# Patient Record
Sex: FEMALE | Race: ASIAN | NOT HISPANIC OR LATINO | URBAN - METROPOLITAN AREA
[De-identification: names, ages, dates, MRNs, and addresses within clinical notes are randomized per-mention and may not be internally consistent; named-entity substitution may affect disease eponyms.]

---

## 2018-08-18 ENCOUNTER — EMERGENCY (EMERGENCY)
Facility: HOSPITAL | Age: 58
LOS: 1 days | Discharge: HOME | End: 2018-08-18
Attending: EMERGENCY MEDICINE | Admitting: EMERGENCY MEDICINE
Payer: COMMERCIAL

## 2018-08-18 VITALS
DIASTOLIC BLOOD PRESSURE: 85 MMHG | OXYGEN SATURATION: 100 % | TEMPERATURE: 99 F | SYSTOLIC BLOOD PRESSURE: 160 MMHG | RESPIRATION RATE: 16 BRPM | HEART RATE: 77 BPM

## 2018-08-18 PROCEDURE — 99284 EMERGENCY DEPT VISIT MOD MDM: CPT | Mod: 25

## 2018-08-18 NOTE — ED ADULT TRIAGE NOTE - CHIEF COMPLAINT QUOTE
dizziness, feeling "room spinning." Denies weakness, SOB, CP. States no help with meclizine. Dx with vertigo x 3 days ago

## 2018-08-19 VITALS
SYSTOLIC BLOOD PRESSURE: 188 MMHG | OXYGEN SATURATION: 100 % | DIASTOLIC BLOOD PRESSURE: 83 MMHG | HEART RATE: 66 BPM | RESPIRATION RATE: 16 BRPM

## 2018-08-19 LAB
ALBUMIN SERPL ELPH-MCNC: 4.3 G/DL — SIGNIFICANT CHANGE UP (ref 3.3–5)
ALP SERPL-CCNC: 72 U/L — SIGNIFICANT CHANGE UP (ref 40–120)
ALT FLD-CCNC: 45 U/L — HIGH (ref 4–33)
APTT BLD: 30.2 SEC — SIGNIFICANT CHANGE UP (ref 27.5–37.4)
AST SERPL-CCNC: 24 U/L — SIGNIFICANT CHANGE UP (ref 4–32)
BASOPHILS # BLD AUTO: 0.09 K/UL — SIGNIFICANT CHANGE UP (ref 0–0.2)
BASOPHILS NFR BLD AUTO: 1.2 % — SIGNIFICANT CHANGE UP (ref 0–2)
BILIRUB SERPL-MCNC: 0.3 MG/DL — SIGNIFICANT CHANGE UP (ref 0.2–1.2)
BUN SERPL-MCNC: 15 MG/DL — SIGNIFICANT CHANGE UP (ref 7–23)
CALCIUM SERPL-MCNC: 9.4 MG/DL — SIGNIFICANT CHANGE UP (ref 8.4–10.5)
CHLORIDE SERPL-SCNC: 104 MMOL/L — SIGNIFICANT CHANGE UP (ref 98–107)
CO2 SERPL-SCNC: 24 MMOL/L — SIGNIFICANT CHANGE UP (ref 22–31)
CREAT SERPL-MCNC: 0.72 MG/DL — SIGNIFICANT CHANGE UP (ref 0.5–1.3)
EOSINOPHIL # BLD AUTO: 0.4 K/UL — SIGNIFICANT CHANGE UP (ref 0–0.5)
EOSINOPHIL NFR BLD AUTO: 5.4 % — SIGNIFICANT CHANGE UP (ref 0–6)
GLUCOSE SERPL-MCNC: 97 MG/DL — SIGNIFICANT CHANGE UP (ref 70–99)
HCT VFR BLD CALC: 41 % — SIGNIFICANT CHANGE UP (ref 34.5–45)
HGB BLD-MCNC: 12.4 G/DL — SIGNIFICANT CHANGE UP (ref 11.5–15.5)
IMM GRANULOCYTES # BLD AUTO: 0.02 # — SIGNIFICANT CHANGE UP
IMM GRANULOCYTES NFR BLD AUTO: 0.3 % — SIGNIFICANT CHANGE UP (ref 0–1.5)
INR BLD: 0.89 — SIGNIFICANT CHANGE UP (ref 0.88–1.17)
LYMPHOCYTES # BLD AUTO: 3.45 K/UL — HIGH (ref 1–3.3)
LYMPHOCYTES # BLD AUTO: 46.3 % — HIGH (ref 13–44)
MCHC RBC-ENTMCNC: 22.8 PG — LOW (ref 27–34)
MCHC RBC-ENTMCNC: 30.2 % — LOW (ref 32–36)
MCV RBC AUTO: 75.2 FL — LOW (ref 80–100)
MONOCYTES # BLD AUTO: 0.43 K/UL — SIGNIFICANT CHANGE UP (ref 0–0.9)
MONOCYTES NFR BLD AUTO: 5.8 % — SIGNIFICANT CHANGE UP (ref 2–14)
NEUTROPHILS # BLD AUTO: 3.06 K/UL — SIGNIFICANT CHANGE UP (ref 1.8–7.4)
NEUTROPHILS NFR BLD AUTO: 41 % — LOW (ref 43–77)
NRBC # FLD: 0 — SIGNIFICANT CHANGE UP
PLATELET # BLD AUTO: 297 K/UL — SIGNIFICANT CHANGE UP (ref 150–400)
PMV BLD: 10.5 FL — SIGNIFICANT CHANGE UP (ref 7–13)
POTASSIUM SERPL-MCNC: 3.8 MMOL/L — SIGNIFICANT CHANGE UP (ref 3.5–5.3)
POTASSIUM SERPL-SCNC: 3.8 MMOL/L — SIGNIFICANT CHANGE UP (ref 3.5–5.3)
PROT SERPL-MCNC: 7.4 G/DL — SIGNIFICANT CHANGE UP (ref 6–8.3)
PROTHROM AB SERPL-ACNC: 9.9 SEC — SIGNIFICANT CHANGE UP (ref 9.8–13.1)
RBC # BLD: 5.45 M/UL — HIGH (ref 3.8–5.2)
RBC # FLD: 15.9 % — HIGH (ref 10.3–14.5)
SODIUM SERPL-SCNC: 141 MMOL/L — SIGNIFICANT CHANGE UP (ref 135–145)
TROPONIN T, HIGH SENSITIVITY: < 6 NG/L — SIGNIFICANT CHANGE UP (ref ?–14)
WBC # BLD: 7.45 K/UL — SIGNIFICANT CHANGE UP (ref 3.8–10.5)
WBC # FLD AUTO: 7.45 K/UL — SIGNIFICANT CHANGE UP (ref 3.8–10.5)

## 2018-08-19 PROCEDURE — 70450 CT HEAD/BRAIN W/O DYE: CPT | Mod: 26

## 2018-08-19 RX ORDER — SODIUM CHLORIDE 9 MG/ML
1000 INJECTION INTRAMUSCULAR; INTRAVENOUS; SUBCUTANEOUS ONCE
Qty: 0 | Refills: 0 | Status: COMPLETED | OUTPATIENT
Start: 2018-08-19 | End: 2018-08-19

## 2018-08-19 RX ORDER — METOCLOPRAMIDE HCL 10 MG
10 TABLET ORAL ONCE
Qty: 0 | Refills: 0 | Status: COMPLETED | OUTPATIENT
Start: 2018-08-19 | End: 2018-08-19

## 2018-08-19 RX ORDER — DIAZEPAM 5 MG
1 TABLET ORAL
Qty: 9 | Refills: 0
Start: 2018-08-19 | End: 2018-08-21

## 2018-08-19 RX ADMIN — SODIUM CHLORIDE 1000 MILLILITER(S): 9 INJECTION INTRAMUSCULAR; INTRAVENOUS; SUBCUTANEOUS at 02:26

## 2018-08-19 RX ADMIN — Medication 10 MILLIGRAM(S): at 02:26

## 2018-08-19 NOTE — ED ADULT NURSE NOTE - OBJECTIVE STATEMENT
alert no distress c/o dizziness  worse when going from lying to standing   no dizziness at present   c/o 4/10 headache ( back of head)

## 2018-08-19 NOTE — ED ADULT NURSE NOTE - NSIMPLEMENTINTERV_GEN_ALL_ED
Implemented All Universal Safety Interventions:  [place] to call system. Call bell, personal items and telephone within reach. Instruct patient to call for assistance. Room bathroom lighting operational. Non-slip footwear when patient is off stretcher. Physically safe environment: no spills, clutter or unnecessary equipment. Stretcher in lowest position, wheels locked, appropriate side rails in place.

## 2018-08-19 NOTE — ED PROVIDER NOTE - ATTENDING CONTRIBUTION TO CARE
58 y/o F with h/o HTN, hyperlipidemia with 3 days of intermittent dizziness.  Pt reports room-spinning sensation, mostly when lying flat since Thursday.  She had seen her PMD at the time of onset and started on meclizine, which she has been taking q4hrs without improvement.  Today pt also reports band like headache, took tylenol at noon without relief.  No fever, chills, neck pain or stiffness, cp, sob, abd pain, back pain.  (+)nausea but no vomiting  Currently denies dizziness, but c/o headache.  Well appearing, sitting comfortably in stretcher, awake and alert, nontoxic.  VSS.  NCAT, EOMI, pupil unequal L>R, reactive to light.  Neck is supple no rigidity.  Lungs cta bl.  Cards nl S1/S2, RRR, no MRG.  Abd soft ntnd.  CN II-XII otherwise intact, no pronator drift, no dysmetria on ftn, strength full in all extremities, gait normal no ataxia.  Plan for labs, ivfs, reglan, ct head, reassess.

## 2018-08-19 NOTE — ED PROVIDER NOTE - PROGRESS NOTE DETAILS
Feels well, ambulatory with steady gait, no complaints at this time. CT and labs negative. Given copies of results and neuro list, strict return precautions and rx for valium.

## 2018-08-19 NOTE — ED PROVIDER NOTE - MEDICAL DECISION MAKING DETAILS
57F with dizziness/lightheadedness/near syncope worsening x 3 days. Unequal pupils on exam otherwise nonfocal. Plan for labs, ekg, ct head, reassess.

## 2018-08-19 NOTE — ED PROVIDER NOTE - OBJECTIVE STATEMENT
57F PMH HTN, HLD p/w dizziness x 3 days, worse with lying flat or changing position, particularly when going to lie down. Has been taking meclizine 4q without relief from PMD. Also notes band like headache that started tonight around 11am, progressively worsening through the day, gradual onset. No blurred vision. No NV, though had nausea at initial onset. No chest pain or SOB. No urinary complaints. Ambulatory with steady gait. No prior history of similar dizziness. Not worse with change in head position. Tonight felt like she might pass out from dizziness so came to ED for evaluation.   Daughter at bedside translating.

## 2018-08-20 PROBLEM — I10 ESSENTIAL (PRIMARY) HYPERTENSION: Chronic | Status: ACTIVE | Noted: 2018-08-19

## 2018-08-20 PROBLEM — E78.5 HYPERLIPIDEMIA, UNSPECIFIED: Chronic | Status: ACTIVE | Noted: 2018-08-19

## 2018-10-02 PROBLEM — Z00.00 ENCOUNTER FOR PREVENTIVE HEALTH EXAMINATION: Status: ACTIVE | Noted: 2018-10-02

## 2018-10-22 ENCOUNTER — APPOINTMENT (OUTPATIENT)
Dept: NEUROLOGY | Facility: CLINIC | Age: 58
End: 2018-10-22

## 2020-02-04 ENCOUNTER — EMERGENCY (EMERGENCY)
Facility: HOSPITAL | Age: 60
LOS: 1 days | Discharge: HOME | End: 2020-02-04
Attending: EMERGENCY MEDICINE | Admitting: EMERGENCY MEDICINE
Payer: COMMERCIAL

## 2020-02-04 VITALS
SYSTOLIC BLOOD PRESSURE: 126 MMHG | TEMPERATURE: 98 F | HEART RATE: 95 BPM | OXYGEN SATURATION: 100 % | RESPIRATION RATE: 20 BRPM | DIASTOLIC BLOOD PRESSURE: 70 MMHG

## 2020-02-04 LAB
ALBUMIN SERPL ELPH-MCNC: 4.8 G/DL — SIGNIFICANT CHANGE UP (ref 3.3–5)
ALP SERPL-CCNC: 77 U/L — SIGNIFICANT CHANGE UP (ref 40–120)
ALT FLD-CCNC: 44 U/L — HIGH (ref 4–33)
AMPHET UR-MCNC: NEGATIVE — SIGNIFICANT CHANGE UP
ANION GAP SERPL CALC-SCNC: 15 MMO/L — HIGH (ref 7–14)
ANISOCYTOSIS BLD QL: SLIGHT — SIGNIFICANT CHANGE UP
APAP SERPL-MCNC: < 15 UG/ML — LOW (ref 15–25)
AST SERPL-CCNC: 26 U/L — SIGNIFICANT CHANGE UP (ref 4–32)
BARBITURATES UR SCN-MCNC: NEGATIVE — SIGNIFICANT CHANGE UP
BASOPHILS # BLD AUTO: 0.08 K/UL — SIGNIFICANT CHANGE UP (ref 0–0.2)
BASOPHILS NFR BLD AUTO: 1 % — SIGNIFICANT CHANGE UP (ref 0–2)
BASOPHILS NFR SPEC: 1.8 % — SIGNIFICANT CHANGE UP (ref 0–2)
BENZODIAZ UR-MCNC: NEGATIVE — SIGNIFICANT CHANGE UP
BILIRUB SERPL-MCNC: 0.5 MG/DL — SIGNIFICANT CHANGE UP (ref 0.2–1.2)
BLASTS # FLD: 0 % — SIGNIFICANT CHANGE UP (ref 0–0)
BUN SERPL-MCNC: 12 MG/DL — SIGNIFICANT CHANGE UP (ref 7–23)
CALCIUM SERPL-MCNC: 9.6 MG/DL — SIGNIFICANT CHANGE UP (ref 8.4–10.5)
CANNABINOIDS UR-MCNC: NEGATIVE — SIGNIFICANT CHANGE UP
CHLORIDE SERPL-SCNC: 100 MMOL/L — SIGNIFICANT CHANGE UP (ref 98–107)
CO2 SERPL-SCNC: 22 MMOL/L — SIGNIFICANT CHANGE UP (ref 22–31)
COCAINE METAB.OTHER UR-MCNC: NEGATIVE — SIGNIFICANT CHANGE UP
CREAT SERPL-MCNC: 0.62 MG/DL — SIGNIFICANT CHANGE UP (ref 0.5–1.3)
EOSINOPHIL # BLD AUTO: 0.24 K/UL — SIGNIFICANT CHANGE UP (ref 0–0.5)
EOSINOPHIL NFR BLD AUTO: 3 % — SIGNIFICANT CHANGE UP (ref 0–6)
EOSINOPHIL NFR FLD: 1.8 % — SIGNIFICANT CHANGE UP (ref 0–6)
ETHANOL BLD-MCNC: < 10 MG/DL — SIGNIFICANT CHANGE UP
GLUCOSE SERPL-MCNC: 147 MG/DL — HIGH (ref 70–99)
HBA1C BLD-MCNC: 5.7 % — HIGH (ref 4–5.6)
HCT VFR BLD CALC: 45.5 % — HIGH (ref 34.5–45)
HGB BLD-MCNC: 13.8 G/DL — SIGNIFICANT CHANGE UP (ref 11.5–15.5)
IMM GRANULOCYTES NFR BLD AUTO: 0.5 % — SIGNIFICANT CHANGE UP (ref 0–1.5)
LYMPHOCYTES # BLD AUTO: 2.14 K/UL — SIGNIFICANT CHANGE UP (ref 1–3.3)
LYMPHOCYTES # BLD AUTO: 26.4 % — SIGNIFICANT CHANGE UP (ref 13–44)
LYMPHOCYTES NFR SPEC AUTO: 24.8 % — SIGNIFICANT CHANGE UP (ref 13–44)
MAGNESIUM SERPL-MCNC: 2.2 MG/DL — SIGNIFICANT CHANGE UP (ref 1.6–2.6)
MCHC RBC-ENTMCNC: 22.4 PG — LOW (ref 27–34)
MCHC RBC-ENTMCNC: 30.3 % — LOW (ref 32–36)
MCV RBC AUTO: 73.9 FL — LOW (ref 80–100)
METAMYELOCYTES # FLD: 0 % — SIGNIFICANT CHANGE UP (ref 0–1)
METHADONE UR-MCNC: NEGATIVE — SIGNIFICANT CHANGE UP
MICROCYTES BLD QL: SLIGHT — SIGNIFICANT CHANGE UP
MONOCYTES # BLD AUTO: 0.36 K/UL — SIGNIFICANT CHANGE UP (ref 0–0.9)
MONOCYTES NFR BLD AUTO: 4.4 % — SIGNIFICANT CHANGE UP (ref 2–14)
MONOCYTES NFR BLD: 3.5 % — SIGNIFICANT CHANGE UP (ref 2–9)
MYELOCYTES NFR BLD: 0 % — SIGNIFICANT CHANGE UP (ref 0–0)
NEUTROPHIL AB SER-ACNC: 58.4 % — SIGNIFICANT CHANGE UP (ref 43–77)
NEUTROPHILS # BLD AUTO: 5.25 K/UL — SIGNIFICANT CHANGE UP (ref 1.8–7.4)
NEUTROPHILS NFR BLD AUTO: 64.7 % — SIGNIFICANT CHANGE UP (ref 43–77)
NEUTS BAND # BLD: 2.6 % — SIGNIFICANT CHANGE UP (ref 0–6)
NRBC # FLD: 0 K/UL — SIGNIFICANT CHANGE UP (ref 0–0)
OPIATES UR-MCNC: NEGATIVE — SIGNIFICANT CHANGE UP
OTHER - HEMATOLOGY %: 0 — SIGNIFICANT CHANGE UP
OXYCODONE UR-MCNC: NEGATIVE — SIGNIFICANT CHANGE UP
PCP UR-MCNC: NEGATIVE — SIGNIFICANT CHANGE UP
PHOSPHATE SERPL-MCNC: 3.4 MG/DL — SIGNIFICANT CHANGE UP (ref 2.5–4.5)
PLATELET # BLD AUTO: 302 K/UL — SIGNIFICANT CHANGE UP (ref 150–400)
PLATELET COUNT - ESTIMATE: NORMAL — SIGNIFICANT CHANGE UP
PMV BLD: 9.9 FL — SIGNIFICANT CHANGE UP (ref 7–13)
POLYCHROMASIA BLD QL SMEAR: SLIGHT — SIGNIFICANT CHANGE UP
POTASSIUM SERPL-MCNC: 3.9 MMOL/L — SIGNIFICANT CHANGE UP (ref 3.5–5.3)
POTASSIUM SERPL-SCNC: 3.9 MMOL/L — SIGNIFICANT CHANGE UP (ref 3.5–5.3)
PROMYELOCYTES # FLD: 0 % — SIGNIFICANT CHANGE UP (ref 0–0)
PROT SERPL-MCNC: 8.3 G/DL — SIGNIFICANT CHANGE UP (ref 6–8.3)
RBC # BLD: 6.16 M/UL — HIGH (ref 3.8–5.2)
RBC # FLD: 16.3 % — HIGH (ref 10.3–14.5)
SALICYLATES SERPL-MCNC: < 5 MG/DL — LOW (ref 15–30)
SODIUM SERPL-SCNC: 137 MMOL/L — SIGNIFICANT CHANGE UP (ref 135–145)
VARIANT LYMPHS # BLD: 7.1 % — SIGNIFICANT CHANGE UP
WBC # BLD: 8.11 K/UL — SIGNIFICANT CHANGE UP (ref 3.8–10.5)
WBC # FLD AUTO: 8.11 K/UL — SIGNIFICANT CHANGE UP (ref 3.8–10.5)

## 2020-02-04 PROCEDURE — 99219: CPT

## 2020-02-04 PROCEDURE — 99283 EMERGENCY DEPT VISIT LOW MDM: CPT

## 2020-02-04 RX ORDER — SIMVASTATIN 20 MG/1
10 TABLET, FILM COATED ORAL AT BEDTIME
Refills: 0 | Status: DISCONTINUED | OUTPATIENT
Start: 2020-02-04 | End: 2020-02-11

## 2020-02-04 RX ORDER — IBUPROFEN 200 MG
400 TABLET ORAL ONCE
Refills: 0 | Status: COMPLETED | OUTPATIENT
Start: 2020-02-04 | End: 2020-02-04

## 2020-02-04 RX ORDER — METOCLOPRAMIDE HCL 10 MG
10 TABLET ORAL ONCE
Refills: 0 | Status: COMPLETED | OUTPATIENT
Start: 2020-02-04 | End: 2020-02-04

## 2020-02-04 RX ORDER — ACETAMINOPHEN 500 MG
650 TABLET ORAL ONCE
Refills: 0 | Status: COMPLETED | OUTPATIENT
Start: 2020-02-04 | End: 2020-02-04

## 2020-02-04 RX ORDER — SODIUM CHLORIDE 9 MG/ML
1000 INJECTION INTRAMUSCULAR; INTRAVENOUS; SUBCUTANEOUS
Refills: 0 | Status: DISCONTINUED | OUTPATIENT
Start: 2020-02-04 | End: 2020-02-04

## 2020-02-04 RX ORDER — SODIUM CHLORIDE 9 MG/ML
1000 INJECTION, SOLUTION INTRAVENOUS ONCE
Refills: 0 | Status: COMPLETED | OUTPATIENT
Start: 2020-02-04 | End: 2020-02-04

## 2020-02-04 RX ADMIN — Medication 650 MILLIGRAM(S): at 14:16

## 2020-02-04 RX ADMIN — SIMVASTATIN 10 MILLIGRAM(S): 20 TABLET, FILM COATED ORAL at 21:42

## 2020-02-04 RX ADMIN — Medication 10 MILLIGRAM(S): at 11:38

## 2020-02-04 RX ADMIN — SODIUM CHLORIDE 125 MILLILITER(S): 9 INJECTION INTRAMUSCULAR; INTRAVENOUS; SUBCUTANEOUS at 15:43

## 2020-02-04 RX ADMIN — Medication 650 MILLIGRAM(S): at 21:41

## 2020-02-04 RX ADMIN — Medication 650 MILLIGRAM(S): at 15:11

## 2020-02-04 RX ADMIN — Medication 650 MILLIGRAM(S): at 22:30

## 2020-02-04 RX ADMIN — SODIUM CHLORIDE 1000 MILLILITER(S): 9 INJECTION, SOLUTION INTRAVENOUS at 11:38

## 2020-02-04 NOTE — ED CDU PROVIDER INITIAL DAY NOTE - MEDICAL DECISION MAKING DETAILS
60 y/o female with pmhx of HTN, HLD presents to ED for taking 20 pills of 5mg Amlodipine (total of 100mg) last night about 10pm (16hrs ago). Pt states she developed gradual onset headache, palpitations, dizziness/ lightheadedness and neck swelling today. Pt feeling better after fluids in ED, symptoms resolved other than headache. will treat headache symptomatically and observe for 24 hrs.

## 2020-02-04 NOTE — ED CDU PROVIDER INITIAL DAY NOTE - ENMT, MLM
Airway patent. Nasal mucosa clear. Mouth with normal mucosa. Throat has no vesicles, no oropharyngeal exudates and uvula is midline. No angioedema.

## 2020-02-04 NOTE — ED PROVIDER NOTE - CARE PLAN
Principal Discharge DX:	Calcium channel blocker overdose, accidental or unintentional, initial encounter

## 2020-02-04 NOTE — ED ADULT NURSE NOTE - NSIMPLEMENTINTERV_GEN_ALL_ED
Implemented All Fall Risk Interventions:  [place] to call system. Call bell, personal items and telephone within reach. Instruct patient to call for assistance. Room bathroom lighting operational. Non-slip footwear when patient is off stretcher. Physically safe environment: no spills, clutter or unnecessary equipment. Stretcher in lowest position, wheels locked, appropriate side rails in place. Provide visual cue, wrist band, yellow gown, etc. Monitor gait and stability. Monitor for mental status changes and reorient to person, place, and time. Review medications for side effects contributing to fall risk. Reinforce activity limits and safety measures with patient and family.

## 2020-02-04 NOTE — ED CDU PROVIDER INITIAL DAY NOTE - OBJECTIVE STATEMENT
58 y/o female with pmhx of HTN, HLD presents to ED for taking 20 pills of 5mg Amlodipine (total of 100mg) last night about 10pm (16hrs ago). Pt states she developed gradual onset headache, palpitations, dizziness/ lightheadedness and neck swelling today. Pt feeling better after fluids in ED, symptoms resolved other than headache. Denies syncope. No fever, cp, sob, diaphoresis, n/v, weakness, numbness, tingling. 60 y/o female with pmhx of HTN, HLD presents to ED for taking 20 pills of 5mg Amlodipine (total of 100mg) last night about 10pm (16hrs ago). Pt states she developed gradual onset headache, palpitations, dizziness/ lightheadedness and neck swelling today. Pt feeling better after fluids in ED, symptoms resolved other than headache. Denies syncope. No fever, cp, sob, diaphoresis, n/v, weakness, numbness, tingling. Denies SI.

## 2020-02-04 NOTE — ED PROVIDER NOTE - PHYSICAL EXAMINATION
General appearance: NAD, conversant, afebrile    Eyes: anicteric sclerae, moist conjunctivae; no lid-lag; Pupils equal, round and reactive to light; Extraocular muscles intact   HENT: Atraumatic; oropharynx clear with moist mucous membranes and no mucosal ulcerations; normal hard and soft palate; no pharyngeal erythema or exudate   Neck: Trachea midline; Full range of motion, supple; no thyromegaly or lymphadenopathy   Pulm: Lungs clear to auscultation bilaterally, with normal respiratory effort and no intercostal retractions; normal work of breathing   CV: Regular Rhythm and Rate; Normal S1, S2; No murmurs, rubs, or gallops. 2+ peripheral pulses. Normotensive.    Abdomen: Soft, non-tender, non-distended; no masses or hepatosplenomegaly.   Extremities: Mild edema of neck and BLE.    Skin: Normal temperature, turgor and texture; no rash, ulcers or subcutaneous nodules   Psych: Appropriate affect, cooperative; alert and oriented to person, place and time

## 2020-02-04 NOTE — ED ADULT TRIAGE NOTE - CHIEF COMPLAINT QUOTE
pt coming with dizziness, HA, heart racing, neck pain, feels her throat is swelling, since this AM pt took Amlodipine tab.  5mg x 20 pills (100mg ) by error, pt run out of Losartan 100mg and instead took Amlodipine 100mg to make up the dose.

## 2020-02-04 NOTE — ED CDU PROVIDER INITIAL DAY NOTE - ATTENDING CONTRIBUTION TO CARE
Dr. Vale:  I performed a face to face bedside interview with patient regarding history of present illness, review of symptoms and past medical history. I completed an independent physical exam.  I have discussed patient's plan of care with PA.   I agree with note as stated above, having amended the EMR as needed to reflect my findings.   This includes HISTORY OF PRESENT ILLNESS, HIV, PAST MEDICAL/SURGICAL/FAMILY/SOCIAL HISTORY, ALLERGIES AND HOME MEDICATIONS, REVIEW OF SYSTEMS, PHYSICAL EXAM, and any PROGRESS NOTES during the time I functioned as the attending physician for this patient.    59F h/o HTN, HLD, presents after accidental overdose of amlodipine.  Normally takes 5mg amlodipine and Dr. Vale:  I performed a face to face bedside interview with patient regarding history of present illness, review of symptoms and past medical history. I completed an independent physical exam.  I have discussed patient's plan of care with PA.   I agree with note as stated above, having amended the EMR as needed to reflect my findings.   This includes HISTORY OF PRESENT ILLNESS, HIV, PAST MEDICAL/SURGICAL/FAMILY/SOCIAL HISTORY, ALLERGIES AND HOME MEDICATIONS, REVIEW OF SYSTEMS, PHYSICAL EXAM, and any PROGRESS NOTES during the time I functioned as the attending physician for this patient.    59F h/o HTN, HLD, presents after accidental overdose of amlodipine.  Normally takes 5mg amlodipine and Losartan 100mg.  Ran out of losartan so took 100mg amlodipine instead around 10pm on 2/3/2020.  Presented to ED with dizziness, palpitations.  Those symptoms now resolved, endorses only headache currently.  ROS otherwise negative.    Exam;  - nad  - rrr  - ctab   -abd soft ntnd  - no focal neuro deficits    A/P  - amlodipine overdose  - discussed with toxicology, will observe 24hr for hemodynamic stability

## 2020-02-04 NOTE — ED PROVIDER NOTE - NS ED ROS FT
General: denies fever, chills, weight loss/weight gain.  HENT: denies nasal congestion, sore throat, rhinorrhea, ear pain  Eyes: denies visual changes, blurred vision, eye discharge, eye redness  Neck: denies neck pain, neck swelling  CV: denies chest pain, palpitations  Resp: denies difficulty breathing, cough  Abdominal: +nausea, no vomiting, diarrhea, abdominal pain, blood in stool, dark stool  MSK: denies muscle aches, bony pain, leg pain. +generalized swelling.   Neuro: see HPI  Skin: denies rashes, cuts, bruises  Hematologic: denies unexplained bruises General: denies fever, chills, weight loss/weight gain. + weakness  HENT: denies nasal congestion, sore throat, rhinorrhea, ear pain  Eyes: denies visual changes, blurred vision, eye discharge, eye redness  Neck: denies neck pain, neck swelling  CV: denies chest pain, palpitations  Resp: denies difficulty breathing, cough  Abdominal: +nausea, no vomiting, diarrhea, abdominal pain, blood in stool, dark stool  MSK: denies muscle aches, bony pain, leg pain. +generalized swelling.   Neuro: see HPI  Skin: denies rashes, cuts, bruises  Hematologic: denies unexplained bruises

## 2020-02-04 NOTE — ED PROVIDER NOTE - ATTENDING CONTRIBUTION TO CARE
I performed a face to face evaluation of this patient and performed a full history and physical examination on the patient.  I agree with the resident's history, physical examination, and plan of the patient.  59 year old F PMH HTN presenting after taking 100mg of Amlodipine 13 hrs ago. Symptomatic with headache and lightheadedness, but normotensive and not bradycardic. Toxicology consulted. Patient greater than 12 hours out of ingestion. Symptomatic management. Will CDU patient for symptom resolution and bradycardia, hypotension monitoring.  Seen by toxicology consult.  Normal neuro exam, and normal heart, lung, abdomen and psychiatry exam.

## 2020-02-04 NOTE — ED PROVIDER NOTE - OBJECTIVE STATEMENT
59 year old F PMH HTN (on losartan 100mg and amlodipine 5mg) presents after taking 100mg of amlodipine last night at 10pm. Patient ran out of losartan, and thought she could take 20 pills of the 5mg amlodipine to make up for it. Today she woke up feeling lightheaded, swollen, and with a headache. She called her pharmacist and then her PMD who told her to come to the emergency room. She has not passed out. Denies chest pain. +Nausea. No vomiting or abdominal pain.

## 2020-02-04 NOTE — ED PROVIDER NOTE - CLINICAL SUMMARY MEDICAL DECISION MAKING FREE TEXT BOX
59 year old F PMH HTN presenting after taking 100mg of Amlodipine 13 hrs ago. Symptomatic with headache and lightheadedness, but normotensive and not bradycardic. Toxicology consulted. Patient greater than 12 hours out of ingestion. Symptomatic management. Will CDU patient for sx resolution. 59 year old F PMH HTN presenting after taking 100mg of Amlodipine 13 hrs ago. Symptomatic with headache and lightheadedness, but normotensive and not bradycardic. Toxicology consulted. Patient greater than 12 hours out of ingestion. Symptomatic management. Will CDU patient for symptom resolution and bradycardia, hypotension monitoring.  Seen by toxicology consult.  Normal neuro exam, and normal heart, lung, abdomen and psychiatry exam.

## 2020-02-04 NOTE — ED ADULT NURSE NOTE - OBJECTIVE STATEMENT
Pt to bed TR A. Pt alert and oriented x 3. Primarily Cantonese speaking. Pt overdosed on BP medication after mistaking it for another BP medication. Pt c/o dizziness, HA, palpitations and nausea. Pt hooked up to CM. Sinus rhythm noted. Mild swelling noted to face. Feels throat swelling. No drooling noted. Pt able to speak clearly. IVL placed. Bloods drawn. Will continue to monitor.

## 2020-02-04 NOTE — ED PROVIDER NOTE - CONSTITUTIONAL, MLM
normal... Well appearing, awake, alert, oriented to person, place, time/situation and in no apparent distress except subjective weakness

## 2020-02-04 NOTE — ED CDU PROVIDER INITIAL DAY NOTE - CONSTITUTIONAL, MLM
normal... Well appearing, awake, alert, oriented to person, place, time/situation and in no apparent distress. Calm

## 2020-02-05 VITALS
SYSTOLIC BLOOD PRESSURE: 131 MMHG | HEART RATE: 86 BPM | DIASTOLIC BLOOD PRESSURE: 65 MMHG | TEMPERATURE: 98 F | RESPIRATION RATE: 18 BRPM | OXYGEN SATURATION: 97 %

## 2020-02-05 DIAGNOSIS — T50.904S: ICD-10-CM

## 2020-02-05 PROCEDURE — 99217: CPT

## 2020-02-05 NOTE — ED CDU PROVIDER SUBSEQUENT DAY NOTE - ATTENDING CONTRIBUTION TO CARE
I performed a face-to-face evaluation of the patient and performed a history and physical examination. I agree with the history and physical examination.    Ran out of losartan. Took excess Norvasc for BP lowering. No SI. BP stable overnight. Dc home. Has losartan prescription waiting at pharmacy.

## 2020-02-05 NOTE — ED CDU PROVIDER SUBSEQUENT DAY NOTE - PHYSICAL EXAMINATION
Vital signs reviewed.   CONSTITUTIONAL: Well-appearing; well-nourished; in no apparent distress. Non-toxic appearing.   HEAD: Normocephalic, atraumatic.  EYES: PERRL, EOM intact, conjunctiva and sclera WNL.  CARD: Normal S1, S2; RRR  RESP: Normal chest excursion with respiration; breath sounds clear and equal bilaterally; no wheezes, rhonchi, or rales.  EXT/MS: moves all extremities; distal pulses are normal  SKIN: Normal for age and race; warm; dry; good turgor; no apparent lesions or exudate noted.  NEURO: Awake, alert, oriented x 3, no gross deficits, CN II-XII grossly intact, no motor or sensory deficit noted.  PSYCH: Normal mood; appropriate affect.

## 2020-02-05 NOTE — ED CDU PROVIDER DISPOSITION NOTE - CLINICAL COURSE
Jacob: Ran out of losartan. Took excess Norvasc for BP lowering. No SI. BP stable overnight. Dc home. Has losartan prescription waiting at pharmacy.

## 2020-02-05 NOTE — ED CDU PROVIDER SUBSEQUENT DAY NOTE - MEDICAL DECISION MAKING DETAILS
58 y/o F hx HTN, HLD in CDU for tele/observation sp taking 20 tablets of Amlodipine yesterday. Pt takes Losartan 100mg, ran out of it, so took 100mg of Amlodipine instead. VS stable, no events on tele. Pt is feeling better, cleared from tox standpoint. Family at bedside. Refills of Losartan in the pharmacy now. Will dc home w/ outpt PMD follow up

## 2020-02-05 NOTE — ED CDU PROVIDER DISPOSITION NOTE - NSFOLLOWUPINSTRUCTIONS_ED_ALL_ED_FT
See your primary care doctor within 24-48 hours, bring copies of all reports with you. Take Amlodipine 5mg daily and Losartan 100mg daily. Return to the ER for worsening symptoms, dizziness, palpitations, or any other concerns.

## 2020-02-05 NOTE — ED CDU PROVIDER DISPOSITION NOTE - PATIENT PORTAL LINK FT
You can access the FollowMyHealth Patient Portal offered by [facility] by registering at the following website: [email or web address]. By joining [facility]’s FollowMyHealth portal, you will also be able to view your health information using other applications (apps) compatible with our system.

## 2020-02-05 NOTE — ED CDU PROVIDER SUBSEQUENT DAY NOTE - HISTORY
LEORA Kincaid: 58 y/o F hx HTN, HLD in CDU for tele/observation sp taking 20 tablets of Amlodipine yesterday. Pt takes Losartan 100mg, ran out of it, so took 100mg of Amlodipine instead. VS stable, no events on tele. Pt is feeling better, cleared from tox standpoint. Family at bedside. Refills of Losartan in the pharmacy now. Will dc home w/ outpt PMD follow up

## 2020-02-05 NOTE — CONSULT NOTE ADULT - SUBJECTIVE AND OBJECTIVE BOX
MEDICAL TOXICOLOGY CONSULT    HPI: 59F PMHx of HTN p/w unintentional overdose of 100mg amlodipine at 10PM 2/3. She ran out of her losartan 100mg, and instead took 100mg of amlodipine. Denies SI. She woke up this AM, told this to her PMD, was referred to the ED. Reporting mild headache, swelling of the face and mild lightheadedness when standing up. Denies any syncope, chest pain, sob, abdominal pain, fevers, trauma.    ONSET / TIME of exposure(s): 2/3 10pm    QUANTITY of exposure(s): 100 mg amlodipine    ROUTE of exposure:  _ingestion    CONTEXT of exposure: __at home    ASSOCIATED symptoms: swelling of face, headache, lightheadedness    PAST MEDICAL & SURGICAL HISTORY:  HLD (hyperlipidemia)  HTN (hypertension)  No significant past surgical history        MEDICATION HISTORY:  simvastatin 10 milliGRAM(s) Oral at bedtime      RECREATIONAL / ETHANOL / SUPPLEMENT USE: none    SOCIAL Hx:  lives with daughter    FAMILY HISTORY:  No pertinent family history in first degree relatives      REVIEW OF SYSTEMS:     General:  no fever, chills, malaise, change in weight or fatigue  Eyes:  no blurry vision, double vision, or diminished acuity  ENT:  no tinnitus, decreased acuity, epistaxis, sore throat, dysphagia  Cardiac: no chest pain, syncope, or palpitations  Lung:  no cough, shortness of breath, stridor, or wheezing  GI:  no abdominal pain, nausea, vomiting, diarrhea, or constipation  Genitourinary: no dysuria, hematuria, or incontinence  Ortho: no joint pain, swelling, myalgias, atrophy, or spasm  Skin: no rash, lesions, or pruritis  Neuro: (+) headache, NO weakness/numbness, ataxia, change in speech, dizziness, tremor, or seizure  Psych: _no___depression, __no__anxiety, __no__mania, _no____suicidal, _no__homicidal  Endocrine: no polydypsia, polyuria, heat/cold intolerance  Hematologic: no bleeding, bruising, petechiae, or adenopathy  Immune:  no rhinitis, atopy, immunocompromise, HIV, or cancer    PHYSICAL EXAM  Vital Signs Last 24 Hrs  T(C): 36.4 (05 Feb 2020 09:42), Max: 36.8 (04 Feb 2020 10:47)  T(F): 97.6 (05 Feb 2020 09:42), Max: 98.3 (04 Feb 2020 10:47)  HR: 86 (05 Feb 2020 09:42) (75 - 95)  BP: 131/65 (05 Feb 2020 09:42) (113/63 - 133/57)  BP(mean): --  RR: 18 (05 Feb 2020 09:42) (16 - 20)  SpO2: 97% (05 Feb 2020 09:42) (95% - 100%)  General:  well appearing, nad  Head:  normocephalic & atraumatic  Eyes:  extra-occular movement is intact  Pupils:  _3__ mm, symmetric, reactive to light  Ear, nose, throat:  mucosa is moist____, dentition is __intact_  Neck:  supple  Respiratory:  _nl___ effort, clear to auscultation bilaterally, no rales/ronchi/wheezing  Cardiac:  rate is__nl__, normal rhythm____, no rubs/murmurs/gallops  Abdomen:  Soft, nondistended, nontender, +bowel sounds, no organomegaly, liver is __nl___  :  deferred  Skin:  not dry/diaphoretic, not pink/flushed, turgor is__nl___  Neurologic:  _no__Clonus, nl___ Tremor, Reflexes are__2+___, extremities are supple___, cranial nerves II-XII intact, Level of consciousness is intact____  Psychiatric:  Insight is_good__, alert and oriented x__3__, Memory is _good____, Affect is_nl____    SIGNIFICANT LABORATORY STUDIES:                        13.8   8.11  )-----------( 302      ( 04 Feb 2020 11:18 )             45.5       02-04    137  |  100  |  12  ----------------------------<  147<H>  3.9   |  22  |  0.62    Ca    9.6      04 Feb 2020 11:18  Phos  3.4     02-04  Mg     2.2     02-04    TPro  8.3  /  Alb  4.8  /  TBili  0.5  /  DBili  x   /  AST  26  /  ALT  44<H>  /  AlkPhos  77  02-04              Anion Gap: 15<H> 02-04 @ 11:18  CK: -- 02-04 @ 11:18  Troponin:  --  02-04 @ 11:18  Pro-BNP:  --  02-04 @ 11:18  VBG:  --  02-04 @ 11:18  Carboxyhemoglobin %:  --  02-04 @ 11:18  Methemoglobin %:  --  02-04 @ 11:18  Osmolality Serum:  --  02-04 @ 11:18  Aspirin Level: < 5.0<L>  02-04 @ 11:18  Acetaminophen Level:  < 15.0<L>  02-04 @ 11:18  Ethanol Level:  < 10  02-04 @ 11:18  Digoxin Level:  --  02-04 @ 11:18  Phenytoin Level:  --  02-04 @ 11:18  Carbamazepine level:  --  02-04 @ 11:18  Lamotrigine level:  --  02-04 @ 11:18    ECG: NSR @ 89, QRS 98, ,     RADIOLOGIC STUDIES  none

## 2020-02-05 NOTE — CONSULT NOTE ADULT - ASSESSMENT
59F PMHx of HTN unintentional od on amlodipine at 10PM 2/3. Currently hemodynamically stable, no bradycardia, no hypotension, no orthostatic hypotension.

## 2020-02-05 NOTE — CONSULT NOTE ADULT - ATTENDING COMMENTS
MD Alfaro:  patient seen and evaluated with the fellow.  I was present for key portions of the history & physical, and I agree with the impression & plan.  Adult F > 12 hrs s/p questionable-intent 100mg amlodipine overdose.  No hemodynamic compromise, only headache.  while time to peak in overdose can be delayed up to 24 hrs, we would expect some hemodynamic effect to have taken place by now.  As such, we feel that she is medically cleared from a toxicology perspective.  No role for triptans to treat HA from vasodilatory effects of dihydropyridine calcium channel blocker.

## 2020-02-05 NOTE — ED CDU PROVIDER DISPOSITION NOTE - ATTENDING CONTRIBUTION TO CARE
I performed a face-to-face evaluation of the patient and performed a history and physical examination. I agree with the history and physical examination.    Jacob: Ran out of losartan. Took excess Norvasc for BP lowering. No SI. BP stable overnight. Dc home. Has losartan prescription waiting at pharmacy.

## 2020-12-26 ENCOUNTER — INPATIENT (INPATIENT)
Facility: HOSPITAL | Age: 60
LOS: 1 days | Discharge: HOME | End: 2020-12-28
Attending: SURGERY | Admitting: SURGERY
Payer: COMMERCIAL

## 2020-12-26 ENCOUNTER — RESULT REVIEW (OUTPATIENT)
Age: 60
End: 2020-12-26

## 2020-12-26 ENCOUNTER — TRANSCRIPTION ENCOUNTER (OUTPATIENT)
Age: 60
End: 2020-12-26

## 2020-12-26 VITALS
RESPIRATION RATE: 18 BRPM | SYSTOLIC BLOOD PRESSURE: 167 MMHG | TEMPERATURE: 99 F | DIASTOLIC BLOOD PRESSURE: 72 MMHG | OXYGEN SATURATION: 99 % | HEART RATE: 82 BPM

## 2020-12-26 LAB
ALBUMIN SERPL ELPH-MCNC: 4.8 G/DL — SIGNIFICANT CHANGE UP (ref 3.3–5)
ALP SERPL-CCNC: 57 U/L — SIGNIFICANT CHANGE UP (ref 40–120)
ALT FLD-CCNC: 57 U/L — HIGH (ref 4–33)
ANION GAP SERPL CALC-SCNC: 13 MMOL/L — SIGNIFICANT CHANGE UP (ref 7–14)
AST SERPL-CCNC: 21 U/L — SIGNIFICANT CHANGE UP (ref 4–32)
BASOPHILS # BLD AUTO: 0.08 K/UL — SIGNIFICANT CHANGE UP (ref 0–0.2)
BASOPHILS NFR BLD AUTO: 0.7 % — SIGNIFICANT CHANGE UP (ref 0–2)
BILIRUB SERPL-MCNC: <0.2 MG/DL — SIGNIFICANT CHANGE UP (ref 0.2–1.2)
BUN SERPL-MCNC: 13 MG/DL — SIGNIFICANT CHANGE UP (ref 7–23)
CALCIUM SERPL-MCNC: 9.3 MG/DL — SIGNIFICANT CHANGE UP (ref 8.4–10.5)
CHLORIDE SERPL-SCNC: 103 MMOL/L — SIGNIFICANT CHANGE UP (ref 98–107)
CO2 SERPL-SCNC: 25 MMOL/L — SIGNIFICANT CHANGE UP (ref 22–31)
CREAT SERPL-MCNC: 0.6 MG/DL — SIGNIFICANT CHANGE UP (ref 0.5–1.3)
EOSINOPHIL # BLD AUTO: 0.29 K/UL — SIGNIFICANT CHANGE UP (ref 0–0.5)
EOSINOPHIL NFR BLD AUTO: 2.4 % — SIGNIFICANT CHANGE UP (ref 0–6)
GLUCOSE SERPL-MCNC: 107 MG/DL — HIGH (ref 70–99)
HCT VFR BLD CALC: 42.6 % — SIGNIFICANT CHANGE UP (ref 34.5–45)
HGB BLD-MCNC: 12.8 G/DL — SIGNIFICANT CHANGE UP (ref 11.5–15.5)
IANC: 8.61 K/UL — HIGH (ref 1.5–8.5)
IMM GRANULOCYTES NFR BLD AUTO: 0.4 % — SIGNIFICANT CHANGE UP (ref 0–1.5)
LIDOCAIN IGE QN: 60 U/L — SIGNIFICANT CHANGE UP (ref 7–60)
LYMPHOCYTES # BLD AUTO: 19.5 % — SIGNIFICANT CHANGE UP (ref 13–44)
LYMPHOCYTES # BLD AUTO: 2.32 K/UL — SIGNIFICANT CHANGE UP (ref 1–3.3)
MCHC RBC-ENTMCNC: 22.4 PG — LOW (ref 27–34)
MCHC RBC-ENTMCNC: 30 GM/DL — LOW (ref 32–36)
MCV RBC AUTO: 74.5 FL — LOW (ref 80–100)
MONOCYTES # BLD AUTO: 0.56 K/UL — SIGNIFICANT CHANGE UP (ref 0–0.9)
MONOCYTES NFR BLD AUTO: 4.7 % — SIGNIFICANT CHANGE UP (ref 2–14)
NEUTROPHILS # BLD AUTO: 8.61 K/UL — HIGH (ref 1.8–7.4)
NEUTROPHILS NFR BLD AUTO: 72.3 % — SIGNIFICANT CHANGE UP (ref 43–77)
NRBC # BLD: 0 /100 WBCS — SIGNIFICANT CHANGE UP
NRBC # FLD: 0 K/UL — SIGNIFICANT CHANGE UP
NT-PROBNP SERPL-SCNC: 45 PG/ML — SIGNIFICANT CHANGE UP
PLATELET # BLD AUTO: 349 K/UL — SIGNIFICANT CHANGE UP (ref 150–400)
POTASSIUM SERPL-MCNC: 3.9 MMOL/L — SIGNIFICANT CHANGE UP (ref 3.5–5.3)
POTASSIUM SERPL-SCNC: 3.9 MMOL/L — SIGNIFICANT CHANGE UP (ref 3.5–5.3)
PROT SERPL-MCNC: 8.2 G/DL — SIGNIFICANT CHANGE UP (ref 6–8.3)
RBC # BLD: 5.72 M/UL — HIGH (ref 3.8–5.2)
RBC # FLD: 16 % — HIGH (ref 10.3–14.5)
SODIUM SERPL-SCNC: 141 MMOL/L — SIGNIFICANT CHANGE UP (ref 135–145)
TROPONIN T, HIGH SENSITIVITY RESULT: <6 NG/L — SIGNIFICANT CHANGE UP
WBC # BLD: 11.91 K/UL — HIGH (ref 3.8–10.5)
WBC # FLD AUTO: 11.91 K/UL — HIGH (ref 3.8–10.5)

## 2020-12-26 PROCEDURE — 71046 X-RAY EXAM CHEST 2 VIEWS: CPT | Mod: 26

## 2020-12-26 PROCEDURE — 99285 EMERGENCY DEPT VISIT HI MDM: CPT

## 2020-12-26 RX ORDER — ASPIRIN/CALCIUM CARB/MAGNESIUM 324 MG
162 TABLET ORAL ONCE
Refills: 0 | Status: DISCONTINUED | OUTPATIENT
Start: 2020-12-26 | End: 2020-12-26

## 2020-12-26 RX ORDER — PIPERACILLIN AND TAZOBACTAM 4; .5 G/20ML; G/20ML
3.38 INJECTION, POWDER, LYOPHILIZED, FOR SOLUTION INTRAVENOUS ONCE
Refills: 0 | Status: COMPLETED | OUTPATIENT
Start: 2020-12-26 | End: 2020-12-26

## 2020-12-26 RX ORDER — MORPHINE SULFATE 50 MG/1
4 CAPSULE, EXTENDED RELEASE ORAL ONCE
Refills: 0 | Status: DISCONTINUED | OUTPATIENT
Start: 2020-12-26 | End: 2020-12-26

## 2020-12-26 RX ORDER — METOCLOPRAMIDE HCL 10 MG
10 TABLET ORAL ONCE
Refills: 0 | Status: COMPLETED | OUTPATIENT
Start: 2020-12-26 | End: 2020-12-26

## 2020-12-26 RX ADMIN — MORPHINE SULFATE 4 MILLIGRAM(S): 50 CAPSULE, EXTENDED RELEASE ORAL at 23:19

## 2020-12-26 RX ADMIN — Medication 10 MILLIGRAM(S): at 22:49

## 2020-12-26 RX ADMIN — PIPERACILLIN AND TAZOBACTAM 200 GRAM(S): 4; .5 INJECTION, POWDER, LYOPHILIZED, FOR SOLUTION INTRAVENOUS at 23:19

## 2020-12-26 NOTE — ED PROVIDER NOTE - PHYSICAL EXAMINATION
GEN - NAD; well appearing; A+O x3   HEAD - NC/AT     EYES - EOMI, no conjunctival pallor, no scleral icterus  ENT -   mucous membranes  moist , no discharge      NECK - Neck supple  PULM - CTA b/l,  symmetric breath sounds  COR -  RRR, S1 S2, no murmurs  ABD - TTP right upper quadrant, + guarding.   BACK - no CVA tenderness, nontender spine     EXTREMS - no edema, no deformity, warm and well perfused    SKIN - no rash or bruising      NEUROLOGIC - alert, sensation nl, motor 5/5 RUE/LUE/RLE/LLE

## 2020-12-26 NOTE — ED PROVIDER NOTE - CLINICAL SUMMARY MEDICAL DECISION MAKING FREE TEXT BOX
Presentation most consistent with biliary colic or gastritis without evidence of infection.  History and exam not consistent with AAA, SBO, appendicitis, mesenteric ischemia, nephrolithiasis, pyelonephritis, or diverticulitis.  Will sono r/o cholecystitis, GI cocktail, reassess. abnormal ekg, Will check troponin, ekg, monitor on telemetry, rule out acute coronary syndrome.

## 2020-12-26 NOTE — ED ADULT NURSE NOTE - OBJECTIVE STATEMENT
Pt received to room 17 c/o RUQ pain x 4 days. AxOx4 and ambulatory at baseline, primarily cantonese speaking,  utilized at bedside by MD Mann. Pt c/o constant RUQ pain x 4 days associated with mild nausea, no vomiting, abdomen soft and tender to touch in RUQ. Pt appears uncomfortable, in NAD, respirations even/unlabored, NSR on cardiac monitor, hypertensive at this time, MD aware. Pt endorses headache post morphine administration. Pt denies dizziness, chest pain, SOB, vomiting, diarrhea, fever, cough. 20G IV placed to R AC, labs drawn and sent, pt medicated as per MD orders, pt covid swabbed. PMH of HTN and HLD. Awaiting results, Will continue to monitor.

## 2020-12-26 NOTE — ED PROVIDER NOTE - NS ED ROS FT
Gen: No fever, normal appetite  Eyes: No eye irritation or discharge  ENT: No ear pain, congestion, sore throat  Resp: No cough or trouble breathing  Cardiovascular: No chest pain or palpitation  Gastroenteric: abdominal pain, nausea   :  No change in urine output; no dysuria  MS: No joint or muscle pain  Skin: No rashes  Neuro: No headache; no abnormal movements  Remainder negative, except as per the HPI

## 2020-12-26 NOTE — ED ADULT NURSE NOTE - NSIMPLEMENTINTERV_GEN_ALL_ED
Implemented All Universal Safety Interventions:  [place] to call system. Call bell, personal items and telephone within reach. Instruct patient to call for assistance. Room bathroom lighting operational. Non-slip footwear when patient is off stretcher. Physically safe environment: no spills, clutter or unnecessary equipment. Stretcher in lowest position, wheels locked, appropriate side rails in place.

## 2020-12-26 NOTE — ED PROVIDER NOTE - OBJECTIVE STATEMENT
ID: [id number]  59yo f pmh HTN, HLD, GERD, p/w ruq abdominal pain. initially intermittent however now constant x 4 days.  mild nausea w/o vomiting. no fevers or chills. No dysuria, frequency, or urgency. no diarrhea.

## 2020-12-26 NOTE — ED PROVIDER NOTE - PROGRESS NOTE DETAILS
Ford, PGY2 - CDU recommending surgery consult prior to obs Ford, PGY2 - surgery resident consulted - will see pt in the ED

## 2020-12-26 NOTE — ED ADULT TRIAGE NOTE - CHIEF COMPLAINT QUOTE
Pt Cantonese speaking, translation services used.   Brigido, ID# [id number].  Pt c/o RUQ pain for the past day with nausea but no vomiting.  Denies any fevers/chills/diarrhea/cough.  PMHx:  HTN, high cholesterol

## 2020-12-26 NOTE — ED ADULT NURSE NOTE - CHIEF COMPLAINT QUOTE
Pt Cantonese speaking, translation services used.   Brigido, ID# [id number].  Pt c/o RUQ pain for the past day with nausea but no vomiting.  Denies any fevers/chills/diarrhea/cough.  PMHx:  HTN, high cholesterol

## 2020-12-27 ENCOUNTER — RESULT REVIEW (OUTPATIENT)
Age: 60
End: 2020-12-27

## 2020-12-27 DIAGNOSIS — Z98.891 HISTORY OF UTERINE SCAR FROM PREVIOUS SURGERY: Chronic | ICD-10-CM

## 2020-12-27 DIAGNOSIS — R10.9 UNSPECIFIED ABDOMINAL PAIN: ICD-10-CM

## 2020-12-27 LAB
B PERT DNA SPEC QL NAA+PROBE: SIGNIFICANT CHANGE UP
C PNEUM DNA SPEC QL NAA+PROBE: SIGNIFICANT CHANGE UP
FLUAV H1 2009 PAND RNA SPEC QL NAA+PROBE: SIGNIFICANT CHANGE UP
FLUAV H1 RNA SPEC QL NAA+PROBE: SIGNIFICANT CHANGE UP
FLUAV H3 RNA SPEC QL NAA+PROBE: SIGNIFICANT CHANGE UP
FLUAV SUBTYP SPEC NAA+PROBE: SIGNIFICANT CHANGE UP
FLUBV RNA SPEC QL NAA+PROBE: SIGNIFICANT CHANGE UP
HADV DNA SPEC QL NAA+PROBE: SIGNIFICANT CHANGE UP
HCOV PNL SPEC NAA+PROBE: SIGNIFICANT CHANGE UP
HMPV RNA SPEC QL NAA+PROBE: SIGNIFICANT CHANGE UP
HPIV1 RNA SPEC QL NAA+PROBE: SIGNIFICANT CHANGE UP
HPIV2 RNA SPEC QL NAA+PROBE: SIGNIFICANT CHANGE UP
HPIV3 RNA SPEC QL NAA+PROBE: SIGNIFICANT CHANGE UP
HPIV4 RNA SPEC QL NAA+PROBE: SIGNIFICANT CHANGE UP
RAPID RVP RESULT: SIGNIFICANT CHANGE UP
RSV RNA SPEC QL NAA+PROBE: SIGNIFICANT CHANGE UP
RV+EV RNA SPEC QL NAA+PROBE: SIGNIFICANT CHANGE UP
SARS-COV-2 RNA SPEC QL NAA+PROBE: SIGNIFICANT CHANGE UP

## 2020-12-27 PROCEDURE — 76705 ECHO EXAM OF ABDOMEN: CPT | Mod: 26

## 2020-12-27 PROCEDURE — 99222 1ST HOSP IP/OBS MODERATE 55: CPT | Mod: GC,57

## 2020-12-27 PROCEDURE — 88304 TISSUE EXAM BY PATHOLOGIST: CPT | Mod: 26

## 2020-12-27 PROCEDURE — 47562 LAPAROSCOPIC CHOLECYSTECTOMY: CPT | Mod: GC

## 2020-12-27 RX ORDER — ACETAMINOPHEN 500 MG
650 TABLET ORAL EVERY 6 HOURS
Refills: 0 | Status: DISCONTINUED | OUTPATIENT
Start: 2020-12-27 | End: 2020-12-28

## 2020-12-27 RX ORDER — ATORVASTATIN CALCIUM 80 MG/1
1 TABLET, FILM COATED ORAL
Qty: 0 | Refills: 0 | DISCHARGE

## 2020-12-27 RX ORDER — LOSARTAN POTASSIUM 100 MG/1
1 TABLET, FILM COATED ORAL
Qty: 0 | Refills: 0 | DISCHARGE

## 2020-12-27 RX ORDER — ACETAMINOPHEN 500 MG
1000 TABLET ORAL ONCE
Refills: 0 | Status: COMPLETED | OUTPATIENT
Start: 2020-12-27 | End: 2020-12-27

## 2020-12-27 RX ORDER — PIPERACILLIN AND TAZOBACTAM 4; .5 G/20ML; G/20ML
3.38 INJECTION, POWDER, LYOPHILIZED, FOR SOLUTION INTRAVENOUS EVERY 8 HOURS
Refills: 0 | Status: DISCONTINUED | OUTPATIENT
Start: 2020-12-27 | End: 2020-12-27

## 2020-12-27 RX ORDER — ONDANSETRON 8 MG/1
4 TABLET, FILM COATED ORAL ONCE
Refills: 0 | Status: DISCONTINUED | OUTPATIENT
Start: 2020-12-27 | End: 2020-12-27

## 2020-12-27 RX ORDER — AMLODIPINE BESYLATE 2.5 MG/1
1 TABLET ORAL
Qty: 0 | Refills: 0 | DISCHARGE

## 2020-12-27 RX ORDER — AMLODIPINE BESYLATE 2.5 MG/1
5 TABLET ORAL DAILY
Refills: 0 | Status: DISCONTINUED | OUTPATIENT
Start: 2020-12-27 | End: 2020-12-28

## 2020-12-27 RX ORDER — OXYCODONE HYDROCHLORIDE 5 MG/1
5 TABLET ORAL ONCE
Refills: 0 | Status: DISCONTINUED | OUTPATIENT
Start: 2020-12-27 | End: 2020-12-27

## 2020-12-27 RX ORDER — HYDROMORPHONE HYDROCHLORIDE 2 MG/ML
0.25 INJECTION INTRAMUSCULAR; INTRAVENOUS; SUBCUTANEOUS ONCE
Refills: 0 | Status: DISCONTINUED | OUTPATIENT
Start: 2020-12-27 | End: 2020-12-27

## 2020-12-27 RX ORDER — IBUPROFEN 200 MG
400 TABLET ORAL EVERY 6 HOURS
Refills: 0 | Status: DISCONTINUED | OUTPATIENT
Start: 2020-12-27 | End: 2020-12-28

## 2020-12-27 RX ORDER — ENOXAPARIN SODIUM 100 MG/ML
40 INJECTION SUBCUTANEOUS DAILY
Refills: 0 | Status: DISCONTINUED | OUTPATIENT
Start: 2020-12-27 | End: 2020-12-28

## 2020-12-27 RX ORDER — SODIUM CHLORIDE 9 MG/ML
1000 INJECTION, SOLUTION INTRAVENOUS
Refills: 0 | Status: DISCONTINUED | OUTPATIENT
Start: 2020-12-27 | End: 2020-12-28

## 2020-12-27 RX ORDER — FENTANYL CITRATE 50 UG/ML
25 INJECTION INTRAVENOUS
Refills: 0 | Status: DISCONTINUED | OUTPATIENT
Start: 2020-12-27 | End: 2020-12-27

## 2020-12-27 RX ADMIN — OXYCODONE HYDROCHLORIDE 5 MILLIGRAM(S): 5 TABLET ORAL at 21:40

## 2020-12-27 RX ADMIN — HYDROMORPHONE HYDROCHLORIDE 0.25 MILLIGRAM(S): 2 INJECTION INTRAMUSCULAR; INTRAVENOUS; SUBCUTANEOUS at 08:25

## 2020-12-27 RX ADMIN — Medication 400 MILLIGRAM(S): at 04:52

## 2020-12-27 RX ADMIN — HYDROMORPHONE HYDROCHLORIDE 0.25 MILLIGRAM(S): 2 INJECTION INTRAMUSCULAR; INTRAVENOUS; SUBCUTANEOUS at 08:09

## 2020-12-27 RX ADMIN — PIPERACILLIN AND TAZOBACTAM 25 GRAM(S): 4; .5 INJECTION, POWDER, LYOPHILIZED, FOR SOLUTION INTRAVENOUS at 07:56

## 2020-12-27 RX ADMIN — Medication 400 MILLIGRAM(S): at 12:40

## 2020-12-27 RX ADMIN — ENOXAPARIN SODIUM 40 MILLIGRAM(S): 100 INJECTION SUBCUTANEOUS at 12:07

## 2020-12-27 RX ADMIN — OXYCODONE HYDROCHLORIDE 5 MILLIGRAM(S): 5 TABLET ORAL at 21:10

## 2020-12-27 RX ADMIN — SODIUM CHLORIDE 100 MILLILITER(S): 9 INJECTION, SOLUTION INTRAVENOUS at 21:10

## 2020-12-27 RX ADMIN — Medication 1000 MILLIGRAM(S): at 13:00

## 2020-12-27 NOTE — PRE-OP CHECKLIST - 1.
cantonese speaking- use  phone cantonese speaking- use  phone; Interepreter #[id number] used to verify daughter could be

## 2020-12-27 NOTE — ED ADULT NURSE REASSESSMENT NOTE - NS ED NURSE REASSESS COMMENT FT1
pt c/o RUQ pain at this time, attempted to contact admitting team, awaiting response, pt to go to floor at this time. will monitor.

## 2020-12-27 NOTE — H&P ADULT - NSHPLABSRESULTS_GEN_ALL_CORE
Labs:                        12.8   11.91 )-----------( 349      ( 26 Dec 2020 23:04 )             42.6       12-26    141  |  103  |  13  ----------------------------<  107<H>  3.9   |  25  |  0.60    Ca    9.3      26 Dec 2020 23:04    TPro  8.2  /  Alb  4.8  /  TBili  <0.2  /  DBili  x   /  AST  21  /  ALT  57<H>  /  AlkPhos  57  12-26            Imaging and other studies:  < from: US Abdomen Limited (12.27.20 @ 01:13) >    FINDINGS:    Liver: Increased echogenicity. Measures 16 cm in length.  Bile ducts: Normal caliber. Common bile duct measures 6 mm.  Gallbladder: Cholelithiasis and sludge. No gallbladder wall thickening. Tenderness on exam. Adenomyomatosis.  Pancreas: Obscured by overlying bowel gas.  Right kidney: 9.9 cm. No hydronephrosis.  IVC: Visualized portions are within normal limits.    IMPRESSION:    Cholelithiasis and adenomyomatosis. No sonographic evidence of acute cholecystitis. If there remains clinical concern for acute cholecystitis, HIDA can be obtained for more sensitive evaluation.    Hepatic steatosis.

## 2020-12-27 NOTE — H&P ADULT - HISTORY OF PRESENT ILLNESS
61 y/o with PMHx of HTN and HLD presenting with abd. pain x5 days. Patient reports pain is localized to RUQ without alleviating or aggravating factors. Denies previous episodes of similar pain. States that yesterday pain associated with nausea and a few episodes of emesis. Continuing to have GI fxn, passing flatus and having BM. Denies fevers, chills, chest pain, SOB, diarrhea, constipation, dysuria, recent trauma or travel.

## 2020-12-27 NOTE — H&P ADULT - ASSESSMENT
61 y/o presenting with RUQ pain x5 days, US low suspicion for acute cholecystitis but clinically patient symptomatic      - admit to Dr. Topete   - NPO   - IVF  - IV abx   - added on to OR for lap kathy   - consent in chart     Discussed with attending Dr. topete     [id number]

## 2020-12-27 NOTE — BRIEF OPERATIVE NOTE - OPERATION/FINDINGS
Laparoscopic cholecystectomy, inflamed gallbladder, cystic duct and artery clipped and divided. Hemostasis achieved

## 2020-12-27 NOTE — H&P ADULT - NSHPPHYSICALEXAM_GEN_ALL_CORE
Physical Exam  T(C): 36.9  HR: 73 (73 - 86)  BP: 135/73 (135/73 - 178/70)  RR: 18 (18 - 20)  SpO2: 99% (98% - 99%)  Tmax: T(C): , Max: 37.3 (12-26-20 @ 20:34)    General: well developed, well nourished, NAD  Neuro: alert and oriented, no focal deficits, moves all extremities spontaneously  HEENT: NCAT, EOMI, anicteric, mucosa moist  Respiratory: airway patent, respirations unlabored  CVS: regular rate and rhythm  Abdomen: soft,TTP in RUQ, nondistended  Extremities: no edema, sensation and movement grossly intact  Skin: warm, dry, appropriate color

## 2020-12-28 ENCOUNTER — TRANSCRIPTION ENCOUNTER (OUTPATIENT)
Age: 60
End: 2020-12-28

## 2020-12-28 VITALS
OXYGEN SATURATION: 96 % | HEART RATE: 98 BPM | SYSTOLIC BLOOD PRESSURE: 129 MMHG | TEMPERATURE: 98 F | DIASTOLIC BLOOD PRESSURE: 61 MMHG | RESPIRATION RATE: 16 BRPM

## 2020-12-28 LAB
ALBUMIN SERPL ELPH-MCNC: 4.1 G/DL — SIGNIFICANT CHANGE UP (ref 3.3–5)
ALP SERPL-CCNC: 64 U/L — SIGNIFICANT CHANGE UP (ref 40–120)
ALT FLD-CCNC: 118 U/L — HIGH (ref 4–33)
ANION GAP SERPL CALC-SCNC: 10 MMOL/L — SIGNIFICANT CHANGE UP (ref 7–14)
AST SERPL-CCNC: 72 U/L — HIGH (ref 4–32)
BILIRUB SERPL-MCNC: 0.5 MG/DL — SIGNIFICANT CHANGE UP (ref 0.2–1.2)
BLD GP AB SCN SERPL QL: NEGATIVE — SIGNIFICANT CHANGE UP
BUN SERPL-MCNC: 10 MG/DL — SIGNIFICANT CHANGE UP (ref 7–23)
CALCIUM SERPL-MCNC: 8.4 MG/DL — SIGNIFICANT CHANGE UP (ref 8.4–10.5)
CHLORIDE SERPL-SCNC: 104 MMOL/L — SIGNIFICANT CHANGE UP (ref 98–107)
CO2 SERPL-SCNC: 24 MMOL/L — SIGNIFICANT CHANGE UP (ref 22–31)
CREAT SERPL-MCNC: 0.65 MG/DL — SIGNIFICANT CHANGE UP (ref 0.5–1.3)
GLUCOSE SERPL-MCNC: 125 MG/DL — HIGH (ref 70–99)
HCT VFR BLD CALC: 29.8 % — LOW (ref 34.5–45)
HCT VFR BLD CALC: 32.6 % — LOW (ref 34.5–45)
HCV AB S/CO SERPL IA: 0.07 S/CO — SIGNIFICANT CHANGE UP (ref 0–0.99)
HCV AB SERPL-IMP: SIGNIFICANT CHANGE UP
HGB BLD-MCNC: 8.9 G/DL — LOW (ref 11.5–15.5)
HGB BLD-MCNC: 9.9 G/DL — LOW (ref 11.5–15.5)
MAGNESIUM SERPL-MCNC: 2.1 MG/DL — SIGNIFICANT CHANGE UP (ref 1.6–2.6)
MCHC RBC-ENTMCNC: 22.7 PG — LOW (ref 27–34)
MCHC RBC-ENTMCNC: 23 PG — LOW (ref 27–34)
MCHC RBC-ENTMCNC: 29.9 GM/DL — LOW (ref 32–36)
MCHC RBC-ENTMCNC: 30.4 GM/DL — LOW (ref 32–36)
MCV RBC AUTO: 74.6 FL — LOW (ref 80–100)
MCV RBC AUTO: 77 FL — LOW (ref 80–100)
NRBC # BLD: 0 /100 WBCS — SIGNIFICANT CHANGE UP
NRBC # BLD: 0 /100 WBCS — SIGNIFICANT CHANGE UP
NRBC # FLD: 0 K/UL — SIGNIFICANT CHANGE UP
NRBC # FLD: 0 K/UL — SIGNIFICANT CHANGE UP
PHOSPHATE SERPL-MCNC: 2.8 MG/DL — SIGNIFICANT CHANGE UP (ref 2.5–4.5)
PLATELET # BLD AUTO: 310 K/UL — SIGNIFICANT CHANGE UP (ref 150–400)
PLATELET # BLD AUTO: 321 K/UL — SIGNIFICANT CHANGE UP (ref 150–400)
POTASSIUM SERPL-MCNC: 3.7 MMOL/L — SIGNIFICANT CHANGE UP (ref 3.5–5.3)
POTASSIUM SERPL-SCNC: 3.7 MMOL/L — SIGNIFICANT CHANGE UP (ref 3.5–5.3)
PROT SERPL-MCNC: 6.9 G/DL — SIGNIFICANT CHANGE UP (ref 6–8.3)
RBC # BLD: 3.87 M/UL — SIGNIFICANT CHANGE UP (ref 3.8–5.2)
RBC # BLD: 4.37 M/UL — SIGNIFICANT CHANGE UP (ref 3.8–5.2)
RBC # FLD: 15.9 % — HIGH (ref 10.3–14.5)
RBC # FLD: 15.9 % — HIGH (ref 10.3–14.5)
RH IG SCN BLD-IMP: POSITIVE — SIGNIFICANT CHANGE UP
SODIUM SERPL-SCNC: 138 MMOL/L — SIGNIFICANT CHANGE UP (ref 135–145)
WBC # BLD: 12.47 K/UL — HIGH (ref 3.8–10.5)
WBC # BLD: 13.25 K/UL — HIGH (ref 3.8–10.5)
WBC # FLD AUTO: 12.47 K/UL — HIGH (ref 3.8–10.5)
WBC # FLD AUTO: 13.25 K/UL — HIGH (ref 3.8–10.5)

## 2020-12-28 RX ORDER — ATORVASTATIN CALCIUM 80 MG/1
10 TABLET, FILM COATED ORAL AT BEDTIME
Refills: 0 | Status: DISCONTINUED | OUTPATIENT
Start: 2020-12-28 | End: 2020-12-28

## 2020-12-28 RX ORDER — ACETAMINOPHEN 500 MG
2 TABLET ORAL
Qty: 0 | Refills: 0 | DISCHARGE
Start: 2020-12-28

## 2020-12-28 RX ORDER — IBUPROFEN 200 MG
1 TABLET ORAL
Qty: 0 | Refills: 0 | DISCHARGE
Start: 2020-12-28

## 2020-12-28 RX ORDER — SODIUM,POTASSIUM PHOSPHATES 278-250MG
1 POWDER IN PACKET (EA) ORAL ONCE
Refills: 0 | Status: COMPLETED | OUTPATIENT
Start: 2020-12-28 | End: 2020-12-28

## 2020-12-28 RX ADMIN — Medication 400 MILLIGRAM(S): at 12:03

## 2020-12-28 RX ADMIN — Medication 650 MILLIGRAM(S): at 00:39

## 2020-12-28 RX ADMIN — AMLODIPINE BESYLATE 5 MILLIGRAM(S): 2.5 TABLET ORAL at 06:07

## 2020-12-28 RX ADMIN — Medication 650 MILLIGRAM(S): at 06:07

## 2020-12-28 RX ADMIN — ENOXAPARIN SODIUM 40 MILLIGRAM(S): 100 INJECTION SUBCUTANEOUS at 12:04

## 2020-12-28 RX ADMIN — Medication 650 MILLIGRAM(S): at 12:03

## 2020-12-28 RX ADMIN — Medication 1 TABLET(S): at 08:36

## 2020-12-28 NOTE — DISCHARGE NOTE PROVIDER - NSDCCPCAREPLAN_GEN_ALL_CORE_FT
PRINCIPAL DISCHARGE DIAGNOSIS  Diagnosis: Abdominal pain  Assessment and Plan of Treatment: WOUND CARE:  Please keep incisions clean and dry. Please do not Scrub or rub incisions. Do not use lotion or powder on incisions.   BATHING: You may shower and/or sponge bathe. You may use warm soapy water in the shower and rinse, pat dry.  ACTIVITY: No heavy lifting or straining. Otherwise, you may return to your usual level of physical activity. If you are taking narcotic pain medication DO NOT drive a car, operate machinery or make important decisions.  DIET: Return to your usual diet.  NOTIFY YOUR SURGEON IF YOU HAVE: any bleeding that does not stop, any pus draining from your wound(s), any fever (over 100.4 F) persistent nausea/vomiting, or if your pain is not controlled on your discharge pain medications, unable to urinate.  Please follow up with your primary care physician in one week regarding your hospitalization, bring copies of your discharge paperwork.  Please follow up with your surgeon, Dr. Topete. Please call to make an appointment. [phone]

## 2020-12-28 NOTE — PROGRESS NOTE ADULT - SUBJECTIVE AND OBJECTIVE BOX
Surgery Progress Note    SUBJECTIVE: No acute events overnight. Pt seen and examined at bedside. Patient comfortable. No nausea, vomiting, diarrhea. Pain is controlled. +Flatus/+BM. Tolerating liquids. Has not voided.    Vital Signs Last 24 Hrs  T(C): 36.7 (28 Dec 2020 06:05), Max: 36.9 (27 Dec 2020 13:26)  T(F): 98.1 (28 Dec 2020 06:05), Max: 98.4 (27 Dec 2020 13:26)  HR: 90 (28 Dec 2020 06:05) (71 - 96)  BP: 143/78 (28 Dec 2020 06:05) (103/90 - 162/81)  BP(mean): 108 (27 Dec 2020 18:30) (97 - 113)  RR: 18 (28 Dec 2020 06:05) (16 - 20)  SpO2: 95% (28 Dec 2020 06:05) (95% - 100%)    Physical Exam:  General Appearance: Appears well, in no acute distress, awake, alert, and oriented x3.  Respiratory: No labored breathing  CV: Pulse regularly present  Abdomen: Soft, nontender, nondistended w/o rebound tenderness or guarding. Port sites clean, dry, intact.   Extremities: Warm and well perfused, moving spontaneously    LABS:                        9.9    12.47 )-----------( 321      ( 28 Dec 2020 06:20 )             32.6     12-28    138  |  104  |  10  ----------------------------<  125<H>  3.7   |  24  |  0.65    Ca    8.4      28 Dec 2020 06:20  Phos  2.8     12-28  Mg     2.1     12-28    TPro  6.9  /  Alb  4.1  /  TBili  0.5  /  DBili  x   /  AST  72<H>  /  ALT  118<H>  /  AlkPhos  64  12-28          INs and OUTs:    12-27-20 @ 07:01  -  12-28-20 @ 07:00  --------------------------------------------------------  IN: 840 mL / OUT: 550 mL / NET: 290 mL        Medications:  MEDICATIONS  (STANDING):  acetaminophen   Tablet .. 650 milliGRAM(s) Oral every 6 hours  amLODIPine   Tablet 5 milliGRAM(s) Oral daily  atorvastatin 10 milliGRAM(s) Oral at bedtime  enoxaparin Injectable 40 milliGRAM(s) SubCutaneous daily  ibuprofen  Tablet. 400 milliGRAM(s) Oral every 6 hours  potassium phosphate / sodium phosphate Tablet (K-PHOS No. 2) 1 Tablet(s) Oral once    MEDICATIONS  (PRN):

## 2020-12-28 NOTE — CHART NOTE - NSCHARTNOTEFT_GEN_A_CORE
SURGERY POST-OPERATIVE CHECK    INTERVAL: Patient endorsing some pain. Otherwise recovering well. Tolerating diet. Denies nausea, vomiting, chest pain, shortness of breath.     MEDICATIONS  (STANDING):  acetaminophen   Tablet .. 650 milliGRAM(s) Oral every 6 hours  amLODIPine   Tablet 5 milliGRAM(s) Oral daily  enoxaparin Injectable 40 milliGRAM(s) SubCutaneous daily  ibuprofen  Tablet. 400 milliGRAM(s) Oral every 6 hours  lactated ringers. 1000 milliLiter(s) (100 mL/Hr) IV Continuous <Continuous>    MEDICATIONS  (PRN):      OBJECTIVE:    Vital Signs Last 24 Hrs  T(C): 36.9 (27 Dec 2020 20:43), Max: 37.2 (27 Dec 2020 00:11)  T(F): 98.4 (27 Dec 2020 20:43), Max: 99 (27 Dec 2020 00:11)  HR: 94 (27 Dec 2020 20:43) (71 - 94)  BP: 139/68 (27 Dec 2020 20:43) (103/90 - 178/70)  BP(mean): 108 (27 Dec 2020 18:30) (97 - 113)  RR: 19 (27 Dec 2020 20:43) (16 - 20)  SpO2: 97% (27 Dec 2020 20:43) (96% - 100%)    I&O's Detail    27 Dec 2020 07:01  -  27 Dec 2020 21:39  --------------------------------------------------------  IN:    IV PiggyBack: 100 mL    Lactated Ringers: 500 mL    Oral Fluid: 240 mL  Total IN: 840 mL    OUT:    Voided (mL): 550 mL  Total OUT: 550 mL    Total NET: 290 mL          Daily Height in cm: 124 (27 Dec 2020 12:59)    Daily     LABS:                        12.8   11.91 )-----------( 349      ( 26 Dec 2020 23:04 )             42.6     12-26    141  |  103  |  13  ----------------------------<  107<H>  3.9   |  25  |  0.60    Ca    9.3      26 Dec 2020 23:04    TPro  8.2  /  Alb  4.8  /  TBili  <0.2  /  DBili  x   /  AST  21  /  ALT  57<H>  /  AlkPhos  57  12-26        PHYSICAL EXAM:  General: AAOx3, NAD, lying comfortably in bed  Respiratory: nonlabored breathing  Abdomen: non-distended, soft, appropriately tender  Extremities: no edema    ASSESSMENT AND PLAN:     59 YO F s/p lap kathy a few hours prior. Recovering appropriately on the floor.   [ ] regular diet  [ ] pain control  [ ] OOB/ambi  [ ] IS  [ ] likely dc tomorrow    [facility], [id number]
CAPRINI SCORE    AGE RELATED RISK FACTORS                                                             [ ] Age 41-60 years                                            (1 Point)  [x] Age: 61-74 years                                           (2 Points)                 [ ] Age= 75 years                                                (3 Points)             DISEASE RELATED RISK FACTORS                                                       [ ] Edema in the lower extremities                 (1 Point)                     [ ] Varicose veins                                               (1 Point)                                 [x] BMI > 25 Kg/m2                                            (1 Point)                                  [ ] Serious infection (ie PNA)                            (1 Point)                     [ ] Lung disease ( COPD, Emphysema)            (1 Point)                                                                          [ ] Acute myocardial infarction                         (1 Point)                  [ ] Congestive heart failure (in the previous month)  (1 Point)         [ ] Inflammatory bowel disease                            (1 Point)                  [ ] Central venous access, PICC or Port               (2 points)       (within the last month)                                                                [ ] Stroke (in the previous month)                        (5 Points)    [ ] Previous or present malignancy                       (2 points)                                                                                                                                                         HEMATOLOGY RELATED FACTORS                                                         [ ] Prior episodes of VTE                                     (3 Points)                     [ ] Positive family history for VTE                      (3 Points)                  [ ] Prothrombin 20363 A                                     (3 Points)                     [ ] Factor V Leiden                                                (3 Points)                        [ ] Lupus anticoagulants                                      (3 Points)                                                           [ ] Anticardiolipin antibodies                              (3 Points)                                                       [ ] High homocysteine in the blood                   (3 Points)                                             [ ] Other congenital or acquired thrombophilia      (3 Points)                                                [ ] Heparin induced thrombocytopenia                  (3 Points)                                        MOBILITY RELATED FACTORS  [ ] Bed rest                                                         (1 Point)  [ ] Plaster cast                                                    (2 points)  [ ] Bed bound for more than 72 hours           (2 Points)    GENDER SPECIFIC FACTORS  [ ] Pregnancy or had a baby within the last month   (1 Point)  [ ] Post-partum < 6 weeks                                   (1 Point)  [ ] Hormonal therapy  or oral contraception   (1 Point)  [ ] History of pregnancy complications              (1 point)  [ ] Unexplained or recurrent              (1 Point)    OTHER RISK FACTORS                                           (1 Point)  BMI >40, smoking, diabetes requiring insulin, chemotherapy  blood transfusions and length of surgery over 2 hours    SURGERY RELATED RISK FACTORS  [ ]  Section within the last month     (1 Point)  [ ] Minor surgery                                                  (1 Point)  [ ] Arthroscopic surgery                                       (2 Points)  [x] Planned major surgery lasting more            (2 Points)      than 45 minutes     [ ] Elective hip or knee joint replacement       (5 points)       surgery                                                TRAUMA RELATED RISK FACTORS  [ ] Fracture of the hip, pelvis, or leg                       (5 Points)  [ ] Spinal cord injury resulting in paralysis             (5 points)       (in the previous month)    [ ] Paralysis  (less than 1 month)                             (5 Points)  [ ] Multiple Trauma within 1 month                        (5 Points)    Total Score [   5     ]    Caprini Score 0-2: Low Risk, NO VTE prophylaxis required for most patients, encourage ambulation  Caprini Score 3-6: Moderate Risk , pharmacologic VTE prophylaxis is indicated for most patients (in the absence of contraindications)  Caprini Score Greater than or =7: High risk, pharmacologic VTE prophylaxis indicated for most patients (in the absence of contraindications)

## 2020-12-28 NOTE — PROGRESS NOTE ADULT - ASSESSMENT
60F POD1 s/p lap kathy, recovering appropriately on the floor.     - regular diet  - f/u bladder scan  - pain control  - OOB/ambi  - dispo: Possible DC today    [facility], [id number] 60F POD1 s/p lap kathy, recovering appropriately on the floor.     - regular diet  - f/u bladder scan  - pain control  - OOB/ambi  - f/u noon CBC  - dispo: Possible DC today    [facility], [id number]

## 2020-12-28 NOTE — DISCHARGE NOTE PROVIDER - NSDCMRMEDTOKEN_GEN_ALL_CORE_FT
acetaminophen 325 mg oral tablet: 2 tab(s) orally every 6 hours  amLODIPine 5 mg oral tablet: 1 tab(s) orally once a day  atorvastatin 10 mg oral tablet: 1 tab(s) orally once a day  ibuprofen 400 mg oral tablet: 1 tab(s) orally every 6 hours  losartan 50 mg oral tablet: 1 tab(s) orally once a day   Name Of Product 12: Imiquimod 5% Cream

## 2020-12-28 NOTE — DISCHARGE NOTE NURSING/CASE MANAGEMENT/SOCIAL WORK - NSDCPNINST_GEN_ALL_CORE
Monitor for signs and symptoms of infection such as fever greater than 100.4, chills, redness, swelling, drainage, foul smell from surgical sites, if unable to urinate notify provider. Call doctor's office to schedule your follow up appointments.

## 2020-12-28 NOTE — DISCHARGE NOTE NURSING/CASE MANAGEMENT/SOCIAL WORK - PATIENT PORTAL LINK FT
You can access the FollowMyHealth Patient Portal offered by [facility] by registering at the following website: [email or web address]. By joining [employer]’s FollowMyHealth portal, you will also be able to view your health information using other applications (apps) compatible with our system.

## 2020-12-28 NOTE — DISCHARGE NOTE PROVIDER - HOSPITAL COURSE
This patient is a 61 y/o with PMHx of HTN and HLD presenting with abd. pain x5 days. Patient reports pain is localized to RUQ without alleviating or aggravating factors. Denies previous episodes of similar pain. States that yesterday pain associated with nausea and a few episodes of emesis. Continuing to have GI fxn, passing flatus and having BM. Denies fevers, chills, chest pain, SOB, diarrhea, constipation, dysuria, recent trauma or travel. In ED, patient afebrile, VSS, labs revealed leukocytosis with WBC 11.29.  Exam revealed RUQ tenderness. Patient had an abdominal US which revealed the following: Cholelithiasis and adenomyomatosis. No sonographic evidence of acute cholecystitis. If there remains clinical concern for acute cholecystitis, HIDA can be obtained for more sensitive evaluation. Hepatic steatosis. Patient admitted to surgery service under the care of Dr. Topete. Patient taken to the OR by Dr. Topete 12/27/20 and underwent laparoscopic cholecystectomy. Patient tolerated the procedure well, without complication. Patient remained hemodynamically stable in the PACU and transferred to the surgical floor. Diet was restarted and advanced as tolerated. Pain control was transitioned from IV to PO pain meds. At this time, patient is currently ambulating, voiding, tolerating a regular diet. Pain well controlled on PO pain meds. Patient has been deemed stable for discharge home with follow up as an outpatient.

## 2020-12-28 NOTE — DISCHARGE NOTE PROVIDER - CARE PROVIDER_API CALL
Vitaly Topete  COLON/RECTAL SURGERY  [address] [place] [address]  Phone: [phone]  Fax: [phone]  Follow Up Time: 2 weeks

## 2020-12-30 LAB — SURGICAL PATHOLOGY STUDY: SIGNIFICANT CHANGE UP

## 2021-01-01 LAB
CULTURE RESULTS: SIGNIFICANT CHANGE UP
CULTURE RESULTS: SIGNIFICANT CHANGE UP
SPECIMEN SOURCE: SIGNIFICANT CHANGE UP
SPECIMEN SOURCE: SIGNIFICANT CHANGE UP

## 2021-01-11 ENCOUNTER — APPOINTMENT (OUTPATIENT)
Dept: SURGERY | Facility: CLINIC | Age: 61
End: 2021-01-11
Payer: COMMERCIAL

## 2021-01-11 VITALS
SYSTOLIC BLOOD PRESSURE: 119 MMHG | DIASTOLIC BLOOD PRESSURE: 76 MMHG | WEIGHT: 136 LBS | HEART RATE: 80 BPM | TEMPERATURE: 97.3 F | BODY MASS INDEX: 26.7 KG/M2 | HEIGHT: 60 IN

## 2021-01-11 DIAGNOSIS — Z78.9 OTHER SPECIFIED HEALTH STATUS: ICD-10-CM

## 2021-01-11 PROCEDURE — 99024 POSTOP FOLLOW-UP VISIT: CPT

## 2021-01-11 RX ORDER — SIMVASTATIN 80 MG/1
TABLET, FILM COATED ORAL
Refills: 0 | Status: ACTIVE | COMMUNITY

## 2021-01-11 RX ORDER — AMLODIPINE BESYLATE 5 MG/1
TABLET ORAL
Refills: 0 | Status: ACTIVE | COMMUNITY

## 2021-01-11 RX ORDER — LOSARTAN POTASSIUM 100 MG/1
TABLET, FILM COATED ORAL
Refills: 0 | Status: ACTIVE | COMMUNITY

## 2021-03-08 NOTE — ED ADULT TRIAGE NOTE - PAIN RATING/NUMBER SCALE (0-10): ACTIVITY
9 I personally performed the service described in the documentation recorded by the scribe in my presence, and it accurately and completely records my words and actions.

## 2021-07-15 NOTE — PATIENT PROFILE ADULT - NSTRANSFERBELONGINGSDISPO_GEN_A_NUR
Patient called the office this morning requesting to speak with Dr Mitali Matthews nurse. Patient states she continues to have foot pain. Patient is asking if she needs an appointment or if Dr Luis Jasso will go ahead and prescribe the medication he did the last time.  Patient's call back PJ#[phone] with patient

## 2022-11-04 NOTE — DISCHARGE NOTE PROVIDER - NSDCCAREPROVSEEN_GEN_ALL_CORE_FT
Attempted to call patient via phone with no answer, left a detailed voicemail with results and recommendations per Dr. Prabhakar. Voiced to return call to office with any further questions or concerns.     OK to leave information on vm per  verbal release.     Thanks  Maggi   Vitaly Topete

## 2023-01-17 NOTE — CONSULT NOTE ADULT - PROBLEM SELECTOR RECOMMENDATION 9
- Hemodynamically stable, > 12 hours post-ingestion with mild headache, lightheadedness, and swelling of face, but speaking full sentences/no respiratory distress.  - supportive care, IVF, headache pain control  - Clear from toxicological standpoint if: headache improving, ambulatory w/o lightheadedness or difficulty, and clinically improving.
no

## 2023-04-26 NOTE — ED ADULT TRIAGE NOTE - LANGUAGE ASSISTANCE NEEDED
Impression: Retinal edema: H35.81. Plan: CME OS - resolved and now stable off PF and diclofenac. Observe.  Retina PRN No-Patient/Caregiver offered and refused free interpretation services.

## 2024-04-23 NOTE — ED PROVIDER NOTE - OBSERVING MD:
[FreeTextEntry1] : I, Valdez Uriostegui, documented this note as a scribe on behalf of Dr. Maribel Luna MD on 04/23/2024.
Dr. Vale

## 2024-12-15 NOTE — ED ADULT TRIAGE NOTE - DATE/TIME OF ACCEPTANCE
Medical Decision Making Elements:  (need 2 of 3 broad groups below)    PROBLEM(S) ADDRESSED (need 1 below)  [] 1 or more chronic illness with exacerbation  [] 1 new problem, uncertain diagnosis  [] 1 acute illness with systemic symptoms  [x] 1 acute complicated injury    DATA REVIEWED (need 1 of 3 categories below)  -Cat 1 (need 3 below):    [x] I reviewed prior, unique external source of information    [x] I reviewed test results    [] I ordered test    [x] I obtained information from independent historian  -Cat 2:    [] I independently interpreted lab/imaging  -Cat 3:    [x] I discussed management or test interpretation with a qualified professional    RISK (need 1 below)  [] Medication prescription  [] Minor surgery with patient risk factors  [] Major elective surgery without patient risk factors  [] Diagnosis or treatment significantly affected by social determinants of health
[ ] 1 or more chronic illnesses with exacerbation, progression or side effects of treatment  [ ] 2 or more stable, chronic illnesses  [ ] 1 undiagnosed new problem with uncertain prognosis  [x] 1 acute illness with systemic symptoms  [ ] 1 acute complicated injury    [x] I reviewed prior external notes  [x] I reviewed test results  [ ] I ordered test  [ ] I interpreted lab/ imaging   [x] I discussed management or test interpretation with the following physicians: ortho    [ ] prescription drug management  [ ] decision regarding minor surgery  [ ] diagnosis or treatment significantly limited by social determinants of health
26-Dec-2020 20:37